# Patient Record
Sex: FEMALE | Race: BLACK OR AFRICAN AMERICAN
[De-identification: names, ages, dates, MRNs, and addresses within clinical notes are randomized per-mention and may not be internally consistent; named-entity substitution may affect disease eponyms.]

---

## 2017-10-28 ENCOUNTER — HOSPITAL ENCOUNTER (EMERGENCY)
Dept: HOSPITAL 10 - E/R | Age: 57
Discharge: HOME | End: 2017-10-28
Payer: COMMERCIAL

## 2017-10-28 VITALS — TEMPERATURE: 98.8 F

## 2017-10-28 VITALS — BODY MASS INDEX: 44.64 KG/M2 | HEIGHT: 55 IN | WEIGHT: 192.9 LBS

## 2017-10-28 VITALS — DIASTOLIC BLOOD PRESSURE: 84 MMHG | HEART RATE: 71 BPM | RESPIRATION RATE: 18 BRPM | SYSTOLIC BLOOD PRESSURE: 175 MMHG

## 2017-10-28 DIAGNOSIS — E86.0: Primary | ICD-10-CM

## 2017-10-28 DIAGNOSIS — N30.00: ICD-10-CM

## 2017-10-28 DIAGNOSIS — E11.9: ICD-10-CM

## 2017-10-28 LAB
ADD UMIC: YES
ALBUMIN SERPL-MCNC: 5 G/DL (ref 3.3–4.9)
ALBUMIN/GLOB SERPL: 1.21 {RATIO}
ALP SERPL-CCNC: 116 IU/L (ref 42–121)
ALT SERPL-CCNC: 20 IU/L (ref 13–69)
ANION GAP SERPL CALC-SCNC: 20 MMOL/L (ref 8–16)
AST SERPL-CCNC: 19 IU/L (ref 15–46)
BACTERIA #/AREA URNS HPF: (no result) /HPF
BASOPHILS # BLD AUTO: 0 10^3/UL (ref 0–0.1)
BASOPHILS NFR BLD: 0.2 % (ref 0–2)
BILIRUB DIRECT SERPL-MCNC: 0 MG/DL (ref 0–0.2)
BILIRUB SERPL-MCNC: 0.4 MG/DL (ref 0.2–1.3)
BUN SERPL-MCNC: 17 MG/DL (ref 7–20)
CALCIUM SERPL-MCNC: 9.7 MG/DL (ref 8.4–10.2)
CHLORIDE SERPL-SCNC: 101 MMOL/L (ref 97–110)
CO2 SERPL-SCNC: 30 MMOL/L (ref 21–31)
COLOR UR: YELLOW
CREAT SERPL-MCNC: 0.91 MG/DL (ref 0.44–1)
EOSINOPHIL # BLD: 0 10^3/UL (ref 0–0.5)
EOSINOPHIL NFR BLD: 0 % (ref 0–7)
ERYTHROCYTE [DISTWIDTH] IN BLOOD BY AUTOMATED COUNT: 15.7 % (ref 11.5–14.5)
GLOBULIN SER-MCNC: 4.1 G/DL (ref 1.3–3.2)
GLUCOSE SERPL-MCNC: 335 MG/DL (ref 70–220)
GLUCOSE UR STRIP-MCNC: (no result) MG/DL
HCT VFR BLD CALC: 42.6 % (ref 37–47)
HGB BLD-MCNC: 13.3 G/DL (ref 12–16)
KETONES UR STRIP.AUTO-MCNC: (no result) MG/DL
LYMPHOCYTES # BLD AUTO: 1.4 10^3/UL (ref 0.8–2.9)
LYMPHOCYTES NFR BLD AUTO: 8.6 % (ref 15–51)
MCH RBC QN AUTO: 23.7 PG (ref 29–33)
MCHC RBC AUTO-ENTMCNC: 31.2 G/DL (ref 32–37)
MCV RBC AUTO: 75.8 FL (ref 82–101)
MONOCYTES # BLD: 1.3 10^3/UL (ref 0.3–0.9)
MONOCYTES NFR BLD: 8.3 % (ref 0–11)
NEUTROPHILS # BLD: 13.3 10^3/UL (ref 1.6–7.5)
NEUTROPHILS NFR BLD AUTO: 82.4 % (ref 39–77)
NITRITE UR QL STRIP.AUTO: NEGATIVE MG/DL
NRBC # BLD MANUAL: 0 10^3/UL (ref 0–0)
NRBC BLD AUTO-RTO: 0 /100WBC (ref 0–0)
PLATELET # BLD: 447 10^3/UL (ref 140–415)
PMV BLD AUTO: 10 FL (ref 7.4–10.4)
POTASSIUM SERPL-SCNC: 3.7 MMOL/L (ref 3.5–5.1)
PROT SERPL-MCNC: 9.1 G/DL (ref 6.1–8.1)
RBC # BLD AUTO: 5.62 10^6/UL (ref 4.2–5.4)
RBC # UR AUTO: (no result) MG/DL
SODIUM SERPL-SCNC: 147 MMOL/L (ref 135–144)
SQUAMOUS #/AREA URNS HPF: (no result) /HPF
UR ASCORBIC ACID: NEGATIVE MG/DL
UR BILIRUBIN (DIP): NEGATIVE MG/DL
UR BUDDING YEAST: (no result) /HPF
UR CLARITY: (no result)
UR PH (DIP): 5 (ref 5–9)
UR RBC: 7 /HPF (ref 0–5)
UR SPECIFIC GRAVITY (DIP): 1.02 (ref 1–1.03)
UR TOTAL PROTEIN (DIP): (no result) MG/DL
UROBILINOGEN UR STRIP-ACNC: NEGATIVE MG/DL
WBC # BLD AUTO: 16.1 10^3/UL (ref 4.8–10.8)
WBC # UR STRIP: NEGATIVE LEU/UL

## 2017-10-28 PROCEDURE — 80053 COMPREHEN METABOLIC PANEL: CPT

## 2017-10-28 PROCEDURE — 93005 ELECTROCARDIOGRAM TRACING: CPT

## 2017-10-28 PROCEDURE — 36415 COLL VENOUS BLD VENIPUNCTURE: CPT

## 2017-10-28 PROCEDURE — 96372 THER/PROPH/DIAG INJ SC/IM: CPT

## 2017-10-28 PROCEDURE — 96376 TX/PRO/DX INJ SAME DRUG ADON: CPT

## 2017-10-28 PROCEDURE — 81001 URINALYSIS AUTO W/SCOPE: CPT

## 2017-10-28 PROCEDURE — 83690 ASSAY OF LIPASE: CPT

## 2017-10-28 PROCEDURE — 85025 COMPLETE CBC W/AUTO DIFF WBC: CPT

## 2017-10-28 PROCEDURE — 96374 THER/PROPH/DIAG INJ IV PUSH: CPT

## 2017-10-28 NOTE — ERD
ER Documentation


Chief Complaint


Chief Complaint


S/P CATARACT SURG 3 DAYS AGO. NOW N/V. WITH DYSURIA. AND MILD LLQ AP





HPI


This is a 57-year-old diabetic who presents to the emergency room with nausea 

and vomiting.  The patient states that she had cataract surgery on her right 

eye 3 days ago with procedural sedation and anesthesia.  Since then she has had 

multiple episodes of nonbloody nonbilious emesis.  She denies any chest pain or 

shortness of breath, no abdominal pain or cramping, she does describe mild left 

flank pain that is 2 out of 10.  She does describe some mild hematuria.  No 

fevers or chills, no colicky pain and no migratory flank pain.  She denies any 

headache.





ROS


All systems reviewed and are negative except as per history of present illness.





Medications


Home Meds


Active Scripts


Cephalexin* (Keflex*) 500 Mg Capsule, 500 MG PO BID for 7 Days, CAP


   Prov:CHELY BERGER MD         10/28/17


Ondansetron (Ondansetron Odt) 4 Mg Tab.rapdis, 4 MG PO Q6H Y for NAUSEA AND/OR 

VOMITING, #20 TAB


   Prov:CHELY BERGER MD         10/28/17





Allergies


Allergies:  


Coded Allergies:  


     Unknown: Unable to obtain (Unverified , 10/28/17)


 PT IS ALOC





FmHx


Family History:  diabetes





Physical Exam


Vitals





Vital Signs








  Date Time  Temp Pulse Resp B/P Pulse Ox O2 Delivery O2 Flow Rate FiO2


 


10/28/17 14:48 98.8 82 18 188/89 96 Room Air  


 


10/28/17 13:58  80 18 132/71 99 Room Air  


 


10/28/17 13:17 99.3 101 20 200/100 98   








Physical Exam


General: Well developed, well nourished, no acute distress


Head: Normocephalic, atraumatic.


Eyes: Pupils equally reactive, EOM intact right eye without chemosis or 

evidence of infection, no proptosis, no pain with extraocular movements


ENT: Moist mucous membranes


Neck: Supple, no lymphadenopathy


Respiratory: Lungs clear bilaterally, no distress


Cardiovascular: RRR, no murmurs, rubs, or gallops


Abdominal: Soft, non-tender, non-distended, no peritoneal signs or tenderness 

to McBurney's point, no pulsatile mass, negative Echeverria sign


Neck: No CVA tenderness


: Deferred


MSK: No edema, no unilateral swelling, 5/5 strength


Neurologic: Alert and oriented, moving all extremities, normal speech, no focal 

weakness, no cerebellar signs no meningismus


Skin: No rash


Psych: Normal mood


Result Diagram:  


10/28/17 1330                                                                  

              10/28/17 1330





Results 24 hrs





 Laboratory Tests








Test


  10/28/17


13:00 10/28/17


13:30


 


Urine Color YELLOW  


 


Urine Clarity


  SLIGHTLY


CLOUDY 


 


 


Urine pH 5.0  


 


Urine Specific Gravity 1.017  


 


Urine Ketones 1+mg/dL  


 


Urine Nitrite NEGATIVEmg/dL  


 


Urine Bilirubin NEGATIVEmg/dL  


 


Urine Urobilinogen NEGATIVEmg/dL  


 


Urine Leukocyte Esterase NEGATIVELeu/ul  


 


Urine Microscopic RBC 7/HPF  


 


Urine Microscopic WBC 10/HPF  


 


Urine Squamous Epithelial


Cells FEW/HPF 


  


 


 


Urine Bacteria FEW/HPF  


 


Urine Yeast (Budding) MANY/HPF  


 


Urine Hemoglobin 3+mg/dL  


 


Urine Glucose 3+mg/dL  


 


Urine Total Protein 2+mg/dl  


 


White Blood Count  16.110^3/ul 


 


Red Blood Count  5.6210^6/ul 


 


Hemoglobin  13.3g/dl 


 


Hematocrit  42.6% 


 


Mean Corpuscular Volume  75.8fl 


 


Mean Corpuscular Hemoglobin  23.7pg 


 


Mean Corpuscular Hemoglobin


Concent 


  31.2g/dl 


 


 


Red Cell Distribution Width  15.7% 


 


Platelet Count  13893^3/UL 


 


Mean Platelet Volume  10.0fl 


 


Neutrophils %  82.4% 


 


Lymphocytes %  8.6% 


 


Monocytes %  8.3% 


 


Eosinophils %  0.0% 


 


Basophils %  0.2% 


 


Nucleated Red Blood Cells %  0.0/100WBC 


 


Neutrophils #  13.310^3/ul 


 


Lymphocytes #  1.410^3/ul 


 


Monocytes #  1.310^3/ul 


 


Eosinophils #  0.010^3/ul 


 


Basophils #  0.010^3/ul 


 


Nucleated Red Blood Cells #  0.010^3/ul 


 


Sodium Level  147mmol/L 


 


Potassium Level  3.7mmol/L 


 


Chloride Level  101mmol/L 


 


Carbon Dioxide Level  30mmol/L 


 


Anion Gap  20 


 


Blood Urea Nitrogen  17mg/dl 


 


Creatinine  0.91mg/dl 


 


Glucose Level  335mg/dl 


 


Calcium Level  9.7mg/dl 


 


Total Bilirubin  0.4mg/dl 


 


Direct Bilirubin  0.00mg/dl 


 


Indirect Bilirubin  0.4mg/dl 


 


Aspartate Amino Transf


(AST/SGOT) 


  19IU/L 


 


 


Alanine Aminotransferase


(ALT/SGPT) 


  20IU/L 


 


 


Alkaline Phosphatase  116IU/L 


 


Total Protein  9.1g/dl 


 


Albumin  5.0g/dl 


 


Globulin  4.10g/dl 


 


Albumin/Globulin Ratio  1.21 


 


Lipase  32U/L 








 Current Medications








 Medications


  (Trade)  Dose


 Ordered  Sig/Ania


 Route


 PRN Reason  Start Time


 Stop Time Status Last Admin


Dose Admin


 


 Sodium Chloride


  (NS)  500 ml @ 


 500 mls/hr  Q1H STAT


 IV


   10/28/17 13:29


 10/28/17 14:28 DC 10/28/17 14:04


 


 


 Ondansetron HCl


  (Zofran Inj)  4 mg  ONCE  STAT


 IV


   10/28/17 13:29


 10/28/17 13:30 DC 10/28/17 14:04


 


 


 Ondansetron HCl


  (Zofran Inj)  4 mg  ONCE  STAT


 IV


   10/28/17 14:57


 10/28/17 14:59 DC 10/28/17 15:05


 


 


 Losartan Potassium


  (Cozaar)  25 mg  ONCE  ONCE


 PO


   10/28/17 15:00


 10/28/17 15:01 DC  


 


 


 Verapamil HCl


  (Isoptin Sr)  180 mg  ONCE  ONCE


 PO


   10/28/17 15:00


 10/28/17 15:01 DC  


 











Procedures/MDM





LAB INTERPRETATION:


Slight leukocytosis, hyperglycemia without evidence of DKA though slight 

ketonuria, normal anion gap.  Possible UTI versus contamination.





MEDICAL DECISION MAKING:


The patient presents with nausea vomiting after procedural sedation and 

anesthesia for cataract surgery.  The patient also describes hematuria.  Patient

's constellation of symptoms are possibly related to adverse reaction to 

anesthesia.  The patient has a benign abdomen without signs of appendicitis, 

bowel obstruction or acute aortic process.  She has no chest pain and no 

anginal equivalent, she has no headache.  Clinical exam does not suggest any 

retrobulbar process or complications related to her ocular surgery.  The 

patient has a benign abdomen and does not necessitate CT imaging at this point.

  I believe fluid resuscitation and basic blood work would be reasonable.  

Urinalysis to rule out UTI.  Low concern for nephrolithiasis or 

ureterolithiasis.





ER COURSE:


The patient's laboratory testing is consistent with dehydration and stress 

response.  While she does have slight leukocytosis she has had multiple repeat 

abdominal exams that are benign.  I did perform a screening EKG to rule out 

active ischemia and while the patient has no chest pain and no anginal 

equivalent the EKG is normal, no indication for troponin given very low 

clinical concern for cardiac etiology.  The patient was given a second dose of 

Zofran and tolerated this well.  She is now tolerating oral intake.  Her blood 

pressure was elevated though she has known hypertension she has not been able 

to take her medications today.  I wrote for her regular medications today.  

Additionally she was hyperglycemic with ketonuria, normal anion gap therefore 

this is not consistent with DKA however the patient has had vomiting without 

using her insulin today.  She usually takes 20 units of Humalog.  Given her 

slightly decreased oral intake I will give her 10 units of Humalog here.  She 

was given something to eat by mouth.





Again, the patient has had multiple repeat abdominal exams and has no sign of 

acute intra-abdominal process.  I do not believe CT imaging is necessary.





At this point I feel she is safe for discharge.  We did discuss return 

precautions including worsening symptoms or pain.  She will be started on 

Keflex for potential urinary tract infection advised to follow-up with primary 

care physician.





I kept the patient and/or family informed of laboratory and diagnostic imaging 

results throughout the emergency room course.





DISPOSITION PLAN:


We discussed follow up with the patient's primary care doctor within 24 to 48 

hours as needed.  We also discussed return to the emergency room for worsening 

symptoms or worsening condition.





Outpatient referral: [None required]





Discharge Medications:


Keflex, Zofran





Departure


Diagnosis:  


 Primary Impression:  


 Dehydration


 Additional Impressions:  


 UTI (urinary tract infection)


 Urinary tract infection type:  acute cystitis  Hematuria presence:  without 

hematuria  Qualified Code:  N30.00 - Acute cystitis without hematuria


 Vomiting


 Vomiting type:  unspecified  Vomiting Intractability:  non-intractable  Nausea 

presence:  with nausea  Qualified Code:  R11.2 - Non-intractable vomiting with 

nausea, unspecified vomiting type


Condition:  CHELY Rodriguez MD Oct 28, 2017 13:41

## 2018-09-23 ENCOUNTER — HOSPITAL ENCOUNTER (EMERGENCY)
Age: 58
LOS: 1 days | Discharge: ANOTHER HOSPITAL | End: 2018-09-24

## 2018-09-23 ENCOUNTER — HOSPITAL ENCOUNTER (EMERGENCY)
Dept: HOSPITAL 91 - E/R | Age: 58
LOS: 1 days | Discharge: ANOTHER HOSPITAL | End: 2018-09-24
Payer: COMMERCIAL

## 2018-09-23 DIAGNOSIS — I49.3: ICD-10-CM

## 2018-09-23 DIAGNOSIS — Z79.82: ICD-10-CM

## 2018-09-23 DIAGNOSIS — E11.65: ICD-10-CM

## 2018-09-23 DIAGNOSIS — A41.9: Primary | ICD-10-CM

## 2018-09-23 DIAGNOSIS — R65.20: ICD-10-CM

## 2018-09-23 DIAGNOSIS — N28.9: ICD-10-CM

## 2018-09-23 DIAGNOSIS — I10: ICD-10-CM

## 2018-09-23 LAB
ADD MAN DIFF?: NO
ADD UMIC: NO
ALANINE AMINOTRANSFERASE: 25 IU/L (ref 13–69)
ALBUMIN/GLOBULIN RATIO: 1.04
ALBUMIN: 4.3 G/DL (ref 3.3–4.9)
ALKALINE PHOSPHATASE: 120 IU/L (ref 42–121)
ANION GAP: 25 (ref 8–16)
ASPARTATE AMINO TRANSFERASE: 16 IU/L (ref 15–46)
BASOPHIL #: 0 10^3/UL (ref 0–0.1)
BASOPHILS %: 0.1 % (ref 0–2)
BILIRUBIN,DIRECT: 0 MG/DL (ref 0–0.2)
BILIRUBIN,TOTAL: 0.6 MG/DL (ref 0.2–1.3)
BLOOD UREA NITROGEN: 38 MG/DL (ref 7–20)
CALCIUM: 11 MG/DL (ref 8.4–10.2)
CARBON DIOXIDE: 25 MMOL/L (ref 21–31)
CHLORIDE: 99 MMOL/L (ref 97–110)
CREATININE: 1.34 MG/DL (ref 0.44–1)
EOSINOPHILS #: 0 10^3/UL (ref 0–0.5)
EOSINOPHILS %: 0 % (ref 0–7)
FIO2: 21 %
GLOBULIN: 4.1 G/DL (ref 1.3–3.2)
GLUCOSE: 633 MG/DL (ref 70–220)
HEMATOCRIT: 47.1 % (ref 37–47)
HEMOGLOBIN: 15 G/DL (ref 12–16)
IMMATURE GRANS #M: 0.09 10^3/UL (ref 0–0.03)
IMMATURE GRANS % (M): 0.6 % (ref 0–0.43)
INR: 1.04
LACTIC ACID: 2.7 MMOL/L (ref 0.5–2)
LACTIC ACID: 3.8 MMOL/L (ref 0.5–2)
LIPASE: 42 U/L (ref 23–300)
LYMPHOCYTES #: 1.4 10^3/UL (ref 0.8–2.9)
LYMPHOCYTES %: 9.6 % (ref 15–51)
MAGNESIUM: 2.3 MG/DL (ref 1.7–2.5)
MEAN CORPUSCULAR HEMOGLOBIN: 24.3 PG (ref 29–33)
MEAN CORPUSCULAR HGB CONC: 31.8 G/DL (ref 32–37)
MEAN CORPUSCULAR VOLUME: 76.3 FL (ref 82–101)
MEAN PLATELET VOLUME: 10.4 FL (ref 7.4–10.4)
METHGB VENOUS: 0.3 %
MODE: (no result)
MONOCYTE #: 1.1 10^3/UL (ref 0.3–0.9)
MONOCYTES %: 7.4 % (ref 0–11)
NEUTROPHIL #: 12 10^3/UL (ref 1.6–7.5)
NEUTROPHILS %: 82.3 % (ref 39–77)
NUCLEATED RED BLOOD CELLS #: 0 10^3/UL (ref 0–0)
NUCLEATED RED BLOOD CELLS%: 0 /100WBC (ref 0–0)
PARTIAL THROMBOPLASTIN TIME: 29.7 SEC (ref 25–35)
PHOSPHORUS: 6.8 MG/DL (ref 2.5–4.9)
PLATELET COUNT: 505 10^3/UL (ref 140–415)
POTASSIUM: 4.3 MMOL/L (ref 3.5–5.1)
PROTIME: 13.7 SEC (ref 11.9–14.9)
PT RATIO: 1.1
RED BLOOD COUNT: 6.17 10^6/UL (ref 4.2–5.4)
RED CELL DISTRIBUTION WIDTH: 16.1 % (ref 11.5–14.5)
SAMPLE TYPE: (no result)
SODIUM: 145 MMOL/L (ref 135–144)
TOTAL PROTEIN: 8.4 G/DL (ref 6.1–8.1)
TROPONIN-I: < 0.012 NG/ML (ref 0–0.12)
UR ASCORBIC ACID: NEGATIVE MG/DL
UR BILIRUBIN (DIP): NEGATIVE MG/DL
UR BLOOD (DIP): NEGATIVE MG/DL
UR CLARITY: CLEAR
UR COLOR: (no result)
UR GLUCOSE (DIP): (no result) MG/DL
UR KETONES (DIP): (no result) MG/DL
UR LEUKOCYTE ESTERASE (DIP): NEGATIVE LEU/UL
UR NITRITE (DIP): NEGATIVE MG/DL
UR PH (DIP): 5 (ref 5–9)
UR SPECIFIC GRAVITY (DIP): 1.02 (ref 1–1.03)
UR TOTAL PROTEIN (DIP): NEGATIVE MG/DL
UR UROBILINOGEN (DIP): NEGATIVE MG/DL
VENOUS COHB: 0.7 %
VENOUS FRACTION OXYHGB: 74.1 %
VENOUS OXYGEN SAT: 74.8 MMHG (ref 55–75)
VENOUS TOTAL HEMGLOBIN: 15.9 G/DL
WHITE BLOOD COUNT: 14.6 10^3/UL (ref 4.8–10.8)

## 2018-09-23 PROCEDURE — 83735 ASSAY OF MAGNESIUM: CPT

## 2018-09-23 PROCEDURE — 93005 ELECTROCARDIOGRAM TRACING: CPT

## 2018-09-23 PROCEDURE — 96375 TX/PRO/DX INJ NEW DRUG ADDON: CPT

## 2018-09-23 PROCEDURE — 99291 CRITICAL CARE FIRST HOUR: CPT

## 2018-09-23 PROCEDURE — 96372 THER/PROPH/DIAG INJ SC/IM: CPT

## 2018-09-23 PROCEDURE — 76705 ECHO EXAM OF ABDOMEN: CPT

## 2018-09-23 PROCEDURE — 74019 RADEX ABDOMEN 2 VIEWS: CPT

## 2018-09-23 PROCEDURE — 82962 GLUCOSE BLOOD TEST: CPT

## 2018-09-23 PROCEDURE — 82803 BLOOD GASES ANY COMBINATION: CPT

## 2018-09-23 PROCEDURE — 85025 COMPLETE CBC W/AUTO DIFF WBC: CPT

## 2018-09-23 PROCEDURE — 83690 ASSAY OF LIPASE: CPT

## 2018-09-23 PROCEDURE — 36415 COLL VENOUS BLD VENIPUNCTURE: CPT

## 2018-09-23 PROCEDURE — 96374 THER/PROPH/DIAG INJ IV PUSH: CPT

## 2018-09-23 PROCEDURE — 81003 URINALYSIS AUTO W/O SCOPE: CPT

## 2018-09-23 PROCEDURE — 87040 BLOOD CULTURE FOR BACTERIA: CPT

## 2018-09-23 PROCEDURE — 83605 ASSAY OF LACTIC ACID: CPT

## 2018-09-23 PROCEDURE — 71045 X-RAY EXAM CHEST 1 VIEW: CPT

## 2018-09-23 PROCEDURE — 84100 ASSAY OF PHOSPHORUS: CPT

## 2018-09-23 PROCEDURE — 84484 ASSAY OF TROPONIN QUANT: CPT

## 2018-09-23 PROCEDURE — 80053 COMPREHEN METABOLIC PANEL: CPT

## 2018-09-23 PROCEDURE — 85730 THROMBOPLASTIN TIME PARTIAL: CPT

## 2018-09-23 PROCEDURE — 85610 PROTHROMBIN TIME: CPT

## 2018-09-23 PROCEDURE — 87086 URINE CULTURE/COLONY COUNT: CPT

## 2018-09-23 RX ADMIN — VANCOMYCIN HYDROCHLORIDE 1 MLS/HR: 1 INJECTION, POWDER, LYOPHILIZED, FOR SOLUTION INTRAVENOUS at 23:04

## 2018-09-23 RX ADMIN — ASPIRIN 81 MG CHEWABLE TABLET 1 MG: 81 TABLET CHEWABLE at 23:09

## 2018-09-23 RX ADMIN — THIAMINE HYDROCHLORIDE 1 ML: 100 INJECTION, SOLUTION INTRAMUSCULAR; INTRAVENOUS at 21:07

## 2018-09-23 RX ADMIN — ONDANSETRON HYDROCHLORIDE 1 MG: 2 INJECTION, SOLUTION INTRAMUSCULAR; INTRAVENOUS at 19:25

## 2018-09-23 RX ADMIN — ONDANSETRON HYDROCHLORIDE 1 MG: 2 INJECTION, SOLUTION INTRAMUSCULAR; INTRAVENOUS at 20:20

## 2018-09-23 RX ADMIN — LORAZEPAM 1 MG: 2 INJECTION, SOLUTION INTRAMUSCULAR; INTRAVENOUS at 20:21

## 2018-09-23 RX ADMIN — INSULIN LISPRO 1 UNIT: 100 INJECTION, SOLUTION INTRAVENOUS; SUBCUTANEOUS at 20:43

## 2018-09-23 RX ADMIN — THIAMINE HYDROCHLORIDE 1 MLS/HR: 100 INJECTION, SOLUTION INTRAMUSCULAR; INTRAVENOUS at 19:25

## 2018-09-23 RX ADMIN — PIPERACILLIN SODIUM AND TAZOBACTAM SODIUM 1 MLS/HR: 3; .375 INJECTION, POWDER, LYOPHILIZED, FOR SOLUTION INTRAVENOUS at 20:02

## 2018-09-23 RX ADMIN — METOCLOPRAMIDE HYDROCHLORIDE 1 MG: 10 INJECTION, SOLUTION INTRAMUSCULAR; INTRAVENOUS at 20:21

## 2018-09-24 LAB — LACTIC ACID: 2.8 MMOL/L (ref 0.5–2)

## 2018-12-16 ENCOUNTER — HOSPITAL ENCOUNTER (EMERGENCY)
Dept: HOSPITAL 91 - E/R | Age: 58
LOS: 1 days | Discharge: HOME | End: 2018-12-17
Payer: SELF-PAY

## 2018-12-16 ENCOUNTER — HOSPITAL ENCOUNTER (EMERGENCY)
Age: 58
LOS: 1 days | Discharge: HOME | End: 2018-12-17

## 2018-12-16 DIAGNOSIS — R10.9: Primary | ICD-10-CM

## 2018-12-16 LAB
ADD MAN DIFF?: NO
ADD UMIC: YES
BASOPHIL #: 0.1 10^3/UL (ref 0–0.1)
BASOPHILS %: 0.7 % (ref 0–2)
EOSINOPHILS #: 0 10^3/UL (ref 0–0.5)
EOSINOPHILS %: 0.2 % (ref 0–7)
HEMATOCRIT: 42.7 % (ref 37–47)
HEMOGLOBIN: 13.7 G/DL (ref 12–16)
IMMATURE GRANS #M: 0.05 10^3/UL (ref 0–0.03)
IMMATURE GRANS % (M): 0.6 % (ref 0–0.43)
INR: 0.93
LYMPHOCYTES #: 2.5 10^3/UL (ref 0.8–2.9)
LYMPHOCYTES %: 27.2 % (ref 15–51)
MEAN CORPUSCULAR HEMOGLOBIN: 24.2 PG (ref 29–33)
MEAN CORPUSCULAR HGB CONC: 32.1 G/DL (ref 32–37)
MEAN CORPUSCULAR VOLUME: 75.4 FL (ref 82–101)
MEAN PLATELET VOLUME: 10.8 FL (ref 7.4–10.4)
MONOCYTE #: 0.8 10^3/UL (ref 0.3–0.9)
MONOCYTES %: 8.5 % (ref 0–11)
NEUTROPHIL #: 5.7 10^3/UL (ref 1.6–7.5)
NEUTROPHILS %: 62.8 % (ref 39–77)
NUCLEATED RED BLOOD CELLS #: 0 10^3/UL (ref 0–0)
NUCLEATED RED BLOOD CELLS%: 0 /100WBC (ref 0–0)
PARTIAL THROMBOPLASTIN TIME: 27.6 SEC (ref 23–35)
PLATELET COUNT: 370 10^3/UL (ref 140–415)
PROTIME: 12.6 SEC (ref 11.9–14.9)
PT RATIO: 1
RED BLOOD COUNT: 5.66 10^6/UL (ref 4.2–5.4)
RED CELL DISTRIBUTION WIDTH: 15.7 % (ref 11.5–14.5)
UR ASCORBIC ACID: NEGATIVE MG/DL
UR BILIRUBIN (DIP): NEGATIVE MG/DL
UR BLOOD (DIP): (no result) MG/DL
UR CLARITY: CLEAR
UR COLOR: YELLOW
UR GLUCOSE (DIP): (no result) MG/DL
UR KETONES (DIP): (no result) MG/DL
UR LEUKOCYTE ESTERASE (DIP): NEGATIVE LEU/UL
UR MUCUS: (no result) /HPF
UR NITRITE (DIP): NEGATIVE MG/DL
UR PH (DIP): 5 (ref 5–9)
UR RBC: 1 /HPF (ref 0–5)
UR SPECIFIC GRAVITY (DIP): 1.02 (ref 1–1.03)
UR TOTAL PROTEIN (DIP): NEGATIVE MG/DL
UR UROBILINOGEN (DIP): NEGATIVE MG/DL
UR WBC: 1 /HPF (ref 0–5)
WHITE BLOOD COUNT: 9.1 10^3/UL (ref 4.8–10.8)

## 2018-12-16 PROCEDURE — 81001 URINALYSIS AUTO W/SCOPE: CPT

## 2018-12-16 PROCEDURE — 99285 EMERGENCY DEPT VISIT HI MDM: CPT

## 2018-12-16 PROCEDURE — 82962 GLUCOSE BLOOD TEST: CPT

## 2018-12-16 PROCEDURE — 85730 THROMBOPLASTIN TIME PARTIAL: CPT

## 2018-12-16 PROCEDURE — 36415 COLL VENOUS BLD VENIPUNCTURE: CPT

## 2018-12-16 PROCEDURE — 85610 PROTHROMBIN TIME: CPT

## 2018-12-16 PROCEDURE — 83690 ASSAY OF LIPASE: CPT

## 2018-12-16 PROCEDURE — 74176 CT ABD & PELVIS W/O CONTRAST: CPT

## 2018-12-16 PROCEDURE — 80053 COMPREHEN METABOLIC PANEL: CPT

## 2018-12-16 PROCEDURE — 85025 COMPLETE CBC W/AUTO DIFF WBC: CPT

## 2018-12-16 RX ADMIN — THIAMINE HYDROCHLORIDE 1 MLS/HR: 100 INJECTION, SOLUTION INTRAMUSCULAR; INTRAVENOUS at 23:22

## 2018-12-17 LAB
ALANINE AMINOTRANSFERASE: 21 IU/L (ref 13–69)
ALBUMIN/GLOBULIN RATIO: 1.38
ALBUMIN: 4.3 G/DL (ref 3.3–4.9)
ALKALINE PHOSPHATASE: 75 IU/L (ref 42–121)
ANION GAP: 18 (ref 5–13)
ASPARTATE AMINO TRANSFERASE: 17 IU/L (ref 15–46)
BILIRUBIN,DIRECT: 0 MG/DL (ref 0–0.2)
BILIRUBIN,TOTAL: 1 MG/DL (ref 0.2–1.3)
BLOOD UREA NITROGEN: 22 MG/DL (ref 7–20)
CALCIUM: 9.8 MG/DL (ref 8.4–10.2)
CARBON DIOXIDE: 23 MMOL/L (ref 21–31)
CHLORIDE: 94 MMOL/L (ref 97–110)
CREATININE: 1.03 MG/DL (ref 0.44–1)
GLOBULIN: 3.1 G/DL (ref 1.3–3.2)
GLUCOSE: 492 MG/DL (ref 70–220)
LIPASE: 41 U/L (ref 23–300)
POTASSIUM: 4.6 MMOL/L (ref 3.5–5.1)
SODIUM: 135 MMOL/L (ref 135–144)
TOTAL PROTEIN: 7.4 G/DL (ref 6.1–8.1)

## 2019-02-24 ENCOUNTER — HOSPITAL ENCOUNTER (EMERGENCY)
Dept: HOSPITAL 91 - E/R | Age: 59
Discharge: HOME | End: 2019-02-24
Payer: SELF-PAY

## 2019-02-24 ENCOUNTER — HOSPITAL ENCOUNTER (EMERGENCY)
Dept: HOSPITAL 10 - E/R | Age: 59
Discharge: HOME | End: 2019-02-24
Payer: SELF-PAY

## 2019-02-24 VITALS
HEIGHT: 62 IN | BODY MASS INDEX: 29.21 KG/M2 | WEIGHT: 158.73 LBS | HEIGHT: 62 IN | BODY MASS INDEX: 29.21 KG/M2 | WEIGHT: 158.73 LBS

## 2019-02-24 VITALS — SYSTOLIC BLOOD PRESSURE: 142 MMHG | DIASTOLIC BLOOD PRESSURE: 73 MMHG | HEART RATE: 74 BPM | RESPIRATION RATE: 17 BRPM

## 2019-02-24 DIAGNOSIS — I10: ICD-10-CM

## 2019-02-24 DIAGNOSIS — E11.65: Primary | ICD-10-CM

## 2019-02-24 DIAGNOSIS — Z79.4: ICD-10-CM

## 2019-02-24 LAB
ADD MAN DIFF?: NO
ADD UMIC: YES
ANION GAP: 18 (ref 5–13)
BASOPHIL #: 0.1 10^3/UL (ref 0–0.1)
BASOPHILS %: 0.9 % (ref 0–2)
BLOOD UREA NITROGEN: 22 MG/DL (ref 7–20)
CALCIUM: 9.9 MG/DL (ref 8.4–10.2)
CARBON DIOXIDE: 19 MMOL/L (ref 21–31)
CHLORIDE: 101 MMOL/L (ref 97–110)
CREATININE: 1.13 MG/DL (ref 0.44–1)
EOSINOPHILS #: 0 10^3/UL (ref 0–0.5)
EOSINOPHILS %: 0.1 % (ref 0–7)
GLUCOSE: 444 MG/DL (ref 70–220)
HEMATOCRIT: 45 % (ref 37–47)
HEMOGLOBIN: 14.2 G/DL (ref 12–16)
IMMATURE GRANS #M: 0.05 10^3/UL (ref 0–0.03)
IMMATURE GRANS % (M): 0.7 % (ref 0–0.43)
LYMPHOCYTES #: 1.4 10^3/UL (ref 0.8–2.9)
LYMPHOCYTES %: 20.3 % (ref 15–51)
MEAN CORPUSCULAR HEMOGLOBIN: 25 PG (ref 29–33)
MEAN CORPUSCULAR HGB CONC: 31.6 G/DL (ref 32–37)
MEAN CORPUSCULAR VOLUME: 79.2 FL (ref 82–101)
MEAN PLATELET VOLUME: 11 FL (ref 7.4–10.4)
MONOCYTE #: 0.7 10^3/UL (ref 0.3–0.9)
MONOCYTES %: 10 % (ref 0–11)
NEUTROPHIL #: 4.6 10^3/UL (ref 1.6–7.5)
NEUTROPHILS %: 68 % (ref 39–77)
NUCLEATED RED BLOOD CELLS #: 0 10^3/UL (ref 0–0)
NUCLEATED RED BLOOD CELLS%: 0 /100WBC (ref 0–0)
PLATELET COUNT: 361 10^3/UL (ref 140–415)
POTASSIUM: 5.1 MMOL/L (ref 3.5–5.1)
RED BLOOD COUNT: 5.68 10^6/UL (ref 4.2–5.4)
RED CELL DISTRIBUTION WIDTH: 16.7 % (ref 11.5–14.5)
SODIUM: 138 MMOL/L (ref 135–144)
UR ASCORBIC ACID: NEGATIVE MG/DL
UR BACTERIA: (no result) /HPF
UR BILIRUBIN (DIP): NEGATIVE MG/DL
UR BLOOD (DIP): (no result) MG/DL
UR CLARITY: (no result)
UR COLOR: YELLOW
UR GLUCOSE (DIP): (no result) MG/DL
UR KETONES (DIP): (no result) MG/DL
UR LEUKOCYTE ESTERASE (DIP): (no result) LEU/UL
UR MUCUS: (no result) /HPF
UR NITRITE (DIP): NEGATIVE MG/DL
UR PH (DIP): 5 (ref 5–9)
UR RBC: 9 /HPF (ref 0–5)
UR SPECIFIC GRAVITY (DIP): 1.02 (ref 1–1.03)
UR SQUAMOUS EPITHELIAL CELL: (no result) /HPF
UR TOTAL PROTEIN (DIP): (no result) MG/DL
UR UROBILINOGEN (DIP): NEGATIVE MG/DL
UR WBC: 44 /HPF (ref 0–5)
WHITE BLOOD COUNT: 6.7 10^3/UL (ref 4.8–10.8)

## 2019-02-24 PROCEDURE — 82962 GLUCOSE BLOOD TEST: CPT

## 2019-02-24 PROCEDURE — 96372 THER/PROPH/DIAG INJ SC/IM: CPT

## 2019-02-24 PROCEDURE — 85025 COMPLETE CBC W/AUTO DIFF WBC: CPT

## 2019-02-24 PROCEDURE — 80048 BASIC METABOLIC PNL TOTAL CA: CPT

## 2019-02-24 PROCEDURE — 74176 CT ABD & PELVIS W/O CONTRAST: CPT

## 2019-02-24 PROCEDURE — 81001 URINALYSIS AUTO W/SCOPE: CPT

## 2019-02-24 PROCEDURE — 99285 EMERGENCY DEPT VISIT HI MDM: CPT

## 2019-02-24 RX ADMIN — THIAMINE HYDROCHLORIDE 1 MLS/HR: 100 INJECTION, SOLUTION INTRAMUSCULAR; INTRAVENOUS at 15:35

## 2019-02-24 RX ADMIN — THIAMINE HYDROCHLORIDE 1 MLS/HR: 100 INJECTION, SOLUTION INTRAMUSCULAR; INTRAVENOUS at 17:14

## 2019-02-24 RX ADMIN — INSULIN LISPRO 1 UNIT: 100 INJECTION, SOLUTION INTRAVENOUS; SUBCUTANEOUS at 17:15

## 2019-02-24 NOTE — ERD
ER Documentation


Chief Complaint


Chief Complaint





abd pain x 4 days, flank pain x 1 week





HPI


This is a 59-year-old female whose diabetic on insulin who is not taking any 


insulin since November 2018.  She has polyuria polyphagia and polydipsia.  She 


also is here because she is complaining of right flank pain this morning that 


was sharp but is not occurring now.  Denies nausea vomiting diarrhea no 


hematuria or dysuria no chest pain or shortness of breath





ROS


All systems reviewed and are negative except as per history of present illness.





Medications


Home Meds


Reported Medications


Levothyroxine Sodium* (Levoxyl*) 25 Mcg Tablet, 25 MCG PO BEFORE BREAKFAST, #30 


TAB


   2/24/19


Insulin Glargine* (Lantus*) 100 Unit/Ml Soln, 40 UNIT SC QHS, #1 VIAL


   2/24/19


Insulin Aspart* (Novolog Insulin Pen*) 100 Unit/Ml Soln, 20 UNIT SC WITH MEALS, 


EA


   2/24/19


Sitagliptin* (Januvia*) 100 Mg Tablet, 100 MG PO DAILY, #30 TAB


   2/24/19


Verapamil Hcl* (Isoptin SR*) 180 Mg Tabsr, 180 MG PO DAILY, TAB.SA


   2/24/19


Discontinued Scripts


Tramadol HCl (Tramadol HCl) 50 Mg Tablet, 50 MG PO Q4 PRN for PAIN, #20 TAB


   Prov:ELADIA MILLER.         12/17/18


Metronidazole* (Flagyl*) 500 Mg Tablet, 500 MG PO TID for 7 Days, TAB


   Prov:ELADIA MILLER S.         12/17/18


Ciprofloxacin Hcl* (Ciprofloxacin Hcl*) 500 Mg Tablet, 500 MG PO BID for 7 Days,


TAB


   Prov:ELADIA MILLER         12/17/18


Cephalexin* (Keflex*) 500 Mg Capsule, 500 MG PO BID for 7 Days, CAP


   Prov:CHELY BERGER MD         10/28/17


Ondansetron (Ondansetron Odt) 4 Mg Tab.rapdis, 4 MG PO Q6H PRN for NAUSEA AND/OR


VOMITING, #20 TAB


   Prov:CHELY BERGER MD         10/28/17





Allergies


Allergies:  


Coded Allergies:  


     No Known Allergy (Unverified , 2/24/19)





PMhx/Soc


History of Surgery:  No


Anesthesia Reaction:  No


Hx Neurological Disorder:  No


Hx Respiratory Disorders:  No


Hx Cardiac Disorders:  No


Hx Psychiatric Problems:  No


Hx Miscellaneous Medical Probl:  Yes (DM, HTN, Diverticulitis)


Hx Alcohol Use:  No


Hx Substance Use:  No


Hx Tobacco Use:  No


Smoking Status:  Never smoker





FmHx


Family History:  No coronary disease





Physical Exam


Vitals





Vital Signs


  Date      Temp  Pulse  Resp  B/P (MAP)   Pulse Ox  O2          O2 Flow    FiO2


Time                                                 Delivery    Rate


   2/24/19  99.3    113    18      158/91


     14:56                          (113)





Physical Exam


 Const:      Well-developed, well-nourished


 Head:        Atraumatic, normocephalic


 Eyes:       Normal Conjunctiva, PERRLA, EOMI, normal sclera, no nystagmus


 ENT:         Normal External Ears, Nose and Mouth, dry mucus membranes.


 Neck:        Full range of motion.  No meningismus, no lymphadenopathy.


 Resp:         Clear to auscultation bilaterally, no wheezing, rhonchi, rales


 Cardio:       Regular rate and rhythm, no murmurs, S1 S2 present


 Abd:         Soft, non tender x 4, non distended. Normal bowel sounds, no 


guarding or rebound, no pulsitile abdominal masses or bruits


 Skin:         No petechiae or rashes, no ecchymosis , no maculopapular rash


 Back:        No midline or flank tenderness


 Ext:          No cyanosis, or edema, FROM x 4, normal inspection, 


neurovascularly intact x 4


 Neur:        Awake and alert, STR 5/5 x 4, sensation intact x 4, no focal 


findings, cerebellum intact


 Psych:        Normal Mood and Affect


Result Diagram:  


2/24/19 1545                                                                    


           2/24/19 1545





Results 24 hrs





Laboratory Tests


       Test
                                 2/24/19
15:45  2/24/19
17:11


       White Blood Count                      6.7 10^3/ul


       Red Blood Count                       5.68 10^6/ul


       Hemoglobin                               14.2 g/dl


       Hematocrit                                  45.0 %


       Mean Corpuscular Volume                    79.2 fl


       Mean Corpuscular Hemoglobin                25.0 pg


       Mean Corpuscular Hemoglobin
Concent     31.6 g/dl 
  



       Red Cell Distribution Width                 16.7 %


       Platelet Count                         361 10^3/UL


       Mean Platelet Volume                       11.0 fl


       Immature Granulocytes %                    0.700 %


       Neutrophils %                               68.0 %


       Lymphocytes %                               20.3 %


       Monocytes %                                 10.0 %


       Eosinophils %                                0.1 %


       Basophils %                                  0.9 %


       Nucleated Red Blood Cells %            0.0 /100WBC


       Immature Granulocytes #              0.050 10^3/ul


       Neutrophils #                          4.6 10^3/ul


       Lymphocytes #                          1.4 10^3/ul


       Monocytes #                            0.7 10^3/ul


       Eosinophils #                          0.0 10^3/ul


       Basophils #                            0.1 10^3/ul


       Nucleated Red Blood Cells #            0.0 10^3/ul


       Sodium Level                            138 mmol/L


       Potassium Level                         5.1 mmol/L


       Chloride Level                          101 mmol/L


       Carbon Dioxide Level                     19 mmol/L


       Anion Gap                                       18


       Blood Urea Nitrogen                       22 mg/dl


       Creatinine                              1.13 mg/dl


       Est Glomerular Filtrat Rate
mL/min      59 mL/min 
  



       Glucose Level                            444 mg/dl


       Calcium Level                            9.9 mg/dl


       Bedside Glucose                                         380 mg/dL





Current Medications


 Medications
   Dose
          Sig/Ania
       Start Time
   Status  Last


 (Trade)       Ordered        Route
 PRN     Stop Time              Admin
Dose


                              Reason                                Admin


 Sodium         1,000 ml @ 
   Q1H STAT
      2/24/19       DC           2/24/19


Chloride       1,000 mls/hr   IV
            15:31
                       15:35



                                             2/24/19 16:30


 Sodium         1,000 ml @ 
   Q1H STAT
      2/24/19       DC           2/24/19


Chloride       1,000 mls/hr   IV
            16:49
                       17:14



                                             2/24/19 17:48


 Insulin        8 unit         ONCE  ONCE
    2/24/19       DC           2/24/19


Human
                        SC
            17:00
                       17:15



Lispro
                                      2/24/19 17:01


(Humalog)








Procedures/MDM


                                        


PROCEDURE:


  CT Abdomen and Pelvis Without Intravenous Contrast


 


CLINICAL INDICATION:


  Right flank pain.


 


TECHNIQUE:


  Axial computed tomography images of the abdomen and pelvis without intravenous


contrast.  Sagittal and coronal reformatted images were created and reviewed.  


CTDIvol (mGy) = 11.88; total DLP (mGy-cm) = 705.82.  This CT exam was performed 


using one or more of the following dose reduction techniques:  automated 


exposure control, adjustment of the mA and/or kV according to patient size, 


and/or use of iterative reconstruction technique.  DICOM images are available.


 


COMPARISON:


  None


 


FINDINGS:


  LUNG BASES:  Unremarkable.  No mass.  No consolidation.


 


 ABDOMEN:


  LIVER:  Unremarkable.


  GALLBLADDER AND BILE DUCTS:  Unremarkable.  No calcified stones.  No ductal 


dilation.


  PANCREAS:  Unremarkable.  No ductal dilation.


  SPLEEN:  Unremarkable.  No splenomegaly.


  ADRENALS:  Unremarkable.  No mass.


  KIDNEYS AND URETERS:  The kidneys are morphologically normal. No 


nephrolithiasis. No hydronephrosis. No obstructive uropathy.


  STOMACH AND BOWEL:  Mild diverticulosis of the colon. No findings of acute 


diverticulitis.  No obstruction.


 


 PELVIS:


  APPENDIX:  No findings to suggest acute appendicitis.


  BLADDER:  Unremarkable.  No stones.


  REPRODUCTIVE:  Unremarkable as visualized.


 


 ABDOMEN and PELVIS:


  INTRAPERITONEAL SPACE:  Unremarkable.  No free air.  No significant fluid 


collection.


  BONES/JOINTS:  Degenerative spine changes are noted.  No acute fracture.  No 


dislocation.


  SOFT TISSUES:  Small umbilical hernia noted, containing only fat.


  VASCULATURE:  The abdominal aorta is atherosclerotic. No aneurysm.


  LYMPH NODES:  Unremarkable.  No enlarged lymph nodes.


 


IMPRESSION:     


 


1.  The kidneys are morphologically normal. No nephrolithiasis. No 


hydronephrosis. No obstructive uropathy.


 


2.  Small umbilical hernia noted, containing only fat.


 


3.  Mild diverticulosis of the colon. No findings of acute diverticulitis.


 


 


 


RPTAT: HS


_____________________________________________ 


Alban Abel Physician Assistant           Date    Time 


Electronically viewed and signed by Alban Abel Physician Assistant on 


02/24/2019 17:53 


 


D:  02/24/2019 17:53  T:  02/24/2019 17:53


RmC/





CC: JOELLE SNOW DO





[id number]





Departure


Diagnosis:  


   Primary Impression:  


   Hyperglycemia


   Additional Impression:  


   Flank pain


Condition:  Stable











JOELLE SNOW DO         Feb 24, 2019 15:33